# Patient Record
Sex: FEMALE | Race: WHITE | Employment: UNEMPLOYED | URBAN - METROPOLITAN AREA
[De-identification: names, ages, dates, MRNs, and addresses within clinical notes are randomized per-mention and may not be internally consistent; named-entity substitution may affect disease eponyms.]

---

## 2019-06-11 ENCOUNTER — HOSPITAL ENCOUNTER (OUTPATIENT)
Dept: GENERAL RADIOLOGY | Age: 49
Discharge: HOME | End: 2019-06-11
Payer: MEDICARE

## 2019-06-11 DIAGNOSIS — M79.7 FIBROMYALGIA: ICD-10-CM

## 2019-06-11 DIAGNOSIS — M25.541 PAIN IN JOINT OF RIGHT HAND: ICD-10-CM

## 2019-06-11 DIAGNOSIS — M25.542 PAIN IN JOINT OF LEFT HAND: ICD-10-CM

## 2019-06-11 PROCEDURE — 73120 X-RAY EXAM OF HAND: CPT

## 2025-03-18 NOTE — PROGRESS NOTES
consider injections.  She has tried physical therapy in the past, but this was cost prohibitive.    She continues to get good benefit from Norco 7.5/325 mg 4 times per day as needed.  She also takes carbamazepine through her PCP.  She states these medications improve her quality of life and allow her to be more functional.  She denies any side effects.  She did have updated lab work since her last visit which shows stable kidney function.  No LFTs were obtained.           3/24/2025    10:58 AM   AMB PAIN ASSESSMENT   Location of Pain Back   Location Modifiers --   Severity of Pain 8   Quality of Pain Sharp;Aching   Duration of Pain Persistent   Frequency of Pain Constant        Previous Visit: Overall, pain doing about the same.  Rates her pain as 8/10 today.  She does say is moderately worse today, which she thinks is due to the weather, but her pain has been stable for the past 3 months.  She did develop shingles in October, but is back to her baseline at this point.  She continues to have pain in multiple joints attributed to osteoarthritis as well as pain in her neck and low back attributed to spinal degeneration.  She does also have a diagnosis of fibromyalgia.  She denies significant change in her health since her last visit.  No new medications or diagnoses.  She continues to get good benefit from Norco 7.5/325 mg 4 times per day as needed and carbamazepine.  She states these medications do improve her quality of life and allow her to be more functional.  She denies any side effects.  The urine drug screen obtained at her last visit was appropriate.    Initial HPI (08/13/2018):   Cymbalta, referred by Dr. Maciej Morales for chronic back pain.  His notes indicate treatment for rheumatoid arthritis, osteoarthritis, degenerative joint disease of the shoulder, osteophytes of the hip, joint pain of the ankle and foot, pain in the thoracic spine, and fibromyalgia.  Referral documentation shows prescriptions for Soma,

## 2025-03-24 ENCOUNTER — OFFICE VISIT (OUTPATIENT)
Age: 55
End: 2025-03-24
Payer: MEDICARE

## 2025-03-24 DIAGNOSIS — M53.3 CHRONIC SI JOINT PAIN: ICD-10-CM

## 2025-03-24 DIAGNOSIS — M25.50 POLYARTHRALGIA: ICD-10-CM

## 2025-03-24 DIAGNOSIS — G89.29 CHRONIC BILATERAL LOW BACK PAIN WITHOUT SCIATICA: ICD-10-CM

## 2025-03-24 DIAGNOSIS — M47.812 FACET ARTHROPATHY, CERVICAL: ICD-10-CM

## 2025-03-24 DIAGNOSIS — M15.0 PRIMARY OSTEOARTHRITIS INVOLVING MULTIPLE JOINTS: Primary | ICD-10-CM

## 2025-03-24 DIAGNOSIS — M54.2 CHRONIC NECK PAIN: ICD-10-CM

## 2025-03-24 DIAGNOSIS — G89.29 CHRONIC SI JOINT PAIN: ICD-10-CM

## 2025-03-24 DIAGNOSIS — G89.29 CHRONIC NECK PAIN: ICD-10-CM

## 2025-03-24 DIAGNOSIS — M47.816 FACET ARTHROPATHY, LUMBAR: ICD-10-CM

## 2025-03-24 DIAGNOSIS — M54.50 CHRONIC BILATERAL LOW BACK PAIN WITHOUT SCIATICA: ICD-10-CM

## 2025-03-24 PROCEDURE — G2211 COMPLEX E/M VISIT ADD ON: HCPCS | Performed by: ANESTHESIOLOGY

## 2025-03-24 PROCEDURE — 99214 OFFICE O/P EST MOD 30 MIN: CPT | Performed by: ANESTHESIOLOGY

## 2025-03-24 RX ORDER — METOPROLOL TARTRATE 25 MG/1
25 TABLET, FILM COATED ORAL 2 TIMES DAILY
COMMUNITY
Start: 2025-02-03 | End: 2026-02-03

## 2025-03-24 RX ORDER — LISINOPRIL AND HYDROCHLOROTHIAZIDE 20; 25 MG/1; MG/1
1 TABLET ORAL DAILY
COMMUNITY

## 2025-03-24 RX ORDER — CARBAMAZEPINE 200 MG/1
TABLET ORAL
COMMUNITY

## 2025-03-24 RX ORDER — HYDROCODONE BITARTRATE AND ACETAMINOPHEN 7.5; 325 MG/1; MG/1
1 TABLET ORAL 4 TIMES DAILY PRN
COMMUNITY
Start: 2024-12-30

## 2025-03-24 RX ORDER — HYDROCODONE BITARTRATE AND ACETAMINOPHEN 7.5; 325 MG/1; MG/1
1 TABLET ORAL EVERY 6 HOURS PRN
Qty: 120 TABLET | Refills: 0 | Status: SHIPPED | OUTPATIENT
Start: 2025-04-09 | End: 2025-05-09

## 2025-03-24 RX ORDER — HYDROCODONE BITARTRATE AND ACETAMINOPHEN 7.5; 325 MG/1; MG/1
1 TABLET ORAL EVERY 6 HOURS PRN
Qty: 120 TABLET | Refills: 0 | Status: SHIPPED | OUTPATIENT
Start: 2025-06-07 | End: 2025-07-07

## 2025-03-24 RX ORDER — HYDROCODONE BITARTRATE AND ACETAMINOPHEN 7.5; 325 MG/1; MG/1
1 TABLET ORAL EVERY 6 HOURS PRN
Qty: 120 TABLET | Refills: 0 | Status: SHIPPED | OUTPATIENT
Start: 2025-05-09 | End: 2025-06-08

## 2025-03-24 RX ORDER — ATORVASTATIN CALCIUM 40 MG/1
40 TABLET, FILM COATED ORAL EVERY MORNING
COMMUNITY

## 2025-03-24 RX ORDER — OMEPRAZOLE 40 MG/1
40 CAPSULE, DELAYED RELEASE ORAL EVERY MORNING
COMMUNITY

## 2025-03-24 ASSESSMENT — ENCOUNTER SYMPTOMS
SHORTNESS OF BREATH: 0
ABDOMINAL PAIN: 0
BACK PAIN: 1

## 2025-06-25 ENCOUNTER — OFFICE VISIT (OUTPATIENT)
Age: 55
End: 2025-06-25
Payer: MEDICARE

## 2025-06-25 DIAGNOSIS — G89.29 CHRONIC NECK PAIN: ICD-10-CM

## 2025-06-25 DIAGNOSIS — G89.29 CHRONIC BILATERAL LOW BACK PAIN WITHOUT SCIATICA: ICD-10-CM

## 2025-06-25 DIAGNOSIS — M47.812 FACET ARTHROPATHY, CERVICAL: ICD-10-CM

## 2025-06-25 DIAGNOSIS — M54.50 CHRONIC BILATERAL LOW BACK PAIN WITHOUT SCIATICA: ICD-10-CM

## 2025-06-25 DIAGNOSIS — Z51.81 ENCOUNTER FOR THERAPEUTIC DRUG MONITORING: ICD-10-CM

## 2025-06-25 DIAGNOSIS — G89.29 CHRONIC SI JOINT PAIN: ICD-10-CM

## 2025-06-25 DIAGNOSIS — M54.2 CHRONIC NECK PAIN: ICD-10-CM

## 2025-06-25 DIAGNOSIS — M25.50 POLYARTHRALGIA: Primary | ICD-10-CM

## 2025-06-25 DIAGNOSIS — M15.0 PRIMARY OSTEOARTHRITIS INVOLVING MULTIPLE JOINTS: ICD-10-CM

## 2025-06-25 DIAGNOSIS — M53.3 CHRONIC SI JOINT PAIN: ICD-10-CM

## 2025-06-25 DIAGNOSIS — M25.50 POLYARTHRALGIA: ICD-10-CM

## 2025-06-25 DIAGNOSIS — M47.816 FACET ARTHROPATHY, LUMBAR: ICD-10-CM

## 2025-06-25 PROCEDURE — 99214 OFFICE O/P EST MOD 30 MIN: CPT | Performed by: ANESTHESIOLOGY

## 2025-06-25 PROCEDURE — G2211 COMPLEX E/M VISIT ADD ON: HCPCS | Performed by: ANESTHESIOLOGY

## 2025-06-25 RX ORDER — HYDROCODONE BITARTRATE AND ACETAMINOPHEN 7.5; 325 MG/1; MG/1
1 TABLET ORAL EVERY 6 HOURS PRN
Qty: 120 TABLET | Refills: 0 | Status: SHIPPED | OUTPATIENT
Start: 2025-09-13 | End: 2025-10-13

## 2025-06-25 RX ORDER — HYDROCODONE BITARTRATE AND ACETAMINOPHEN 7.5; 325 MG/1; MG/1
1 TABLET ORAL EVERY 6 HOURS PRN
Qty: 120 TABLET | Refills: 0 | Status: SHIPPED | OUTPATIENT
Start: 2025-07-16 | End: 2025-08-15

## 2025-06-25 RX ORDER — HYDROCODONE BITARTRATE AND ACETAMINOPHEN 7.5; 325 MG/1; MG/1
1 TABLET ORAL EVERY 6 HOURS PRN
Qty: 120 TABLET | Refills: 0 | Status: SHIPPED | OUTPATIENT
Start: 2025-08-15 | End: 2025-09-14

## 2025-06-25 ASSESSMENT — ENCOUNTER SYMPTOMS
SHORTNESS OF BREATH: 0
ABDOMINAL PAIN: 0
BACK PAIN: 1

## 2025-06-25 NOTE — PROGRESS NOTES
Chronic Pain Follow-up Note    Date: June 25, 2025  Patient Name: Rosa Quiros  MRN: [id number]  PCP: Remy Morel  Referring Provider: No ref. provider found    Assessment:   Diagnosis:  1. Polyarthralgia    2. Encounter for therapeutic drug monitoring    3. Primary osteoarthritis involving multiple joints    4. Chronic bilateral low back pain without sciatica    5. Facet arthropathy, lumbar    6. Chronic SI joint pain    7. Chronic neck pain    8. Facet arthropathy, cervical      Plan:      General Recommendations: The pain condition that the patient suffers from is best treated with a multidisciplinary approach that involves an increase in physical activity to prevent de-conditioning and worsening of the pain cycle, as well as psychological counseling (formal and/or informal) to address the co-morbid psychological effects of pain.  Treatment will often involve judicious use of pain medications and interventional procedures to decrease the pain, allowing the patient to participate in the physical activity that will ultimately produce long-lasting pain reductions.  The goal of the multidisciplinary approach is to return the patient to a higher level of overall function and to restore their ability to perform activities of daily living.    Testing:  Updating urine drug screen.     Therapy:  Patient participated in 5-6 sessions of physical therapy, but could not afford to continue going at $45 per visit.  We are providing her with additional exercises targeting her SI joint and low back today.   Has been recommended she try an over-the-counter TENS unit.     Medications:  Is on carbamazepine 200 mg for neuropathy with good response (started by PCP).  Patient has now weaned off Soma and understands that she must stay off of that medication to continue opioids.    Refilled Norco 7.5/325 mg 4 times daily as needed, #120, for for 3 months with fill dates of 7/16/2025, 8/15/2025, and 9/13/2025 (1 day early due to

## 2025-06-30 LAB — DRUGS UR: NORMAL
